# Patient Record
Sex: FEMALE | Race: WHITE | NOT HISPANIC OR LATINO | URBAN - METROPOLITAN AREA
[De-identification: names, ages, dates, MRNs, and addresses within clinical notes are randomized per-mention and may not be internally consistent; named-entity substitution may affect disease eponyms.]

---

## 2018-10-18 ENCOUNTER — OUTPATIENT (OUTPATIENT)
Dept: OUTPATIENT SERVICES | Facility: HOSPITAL | Age: 73
LOS: 1 days | End: 2018-10-18

## 2019-10-24 PROBLEM — Z00.00 ENCOUNTER FOR PREVENTIVE HEALTH EXAMINATION: Status: ACTIVE | Noted: 2019-10-24

## 2019-10-25 ENCOUNTER — APPOINTMENT (OUTPATIENT)
Dept: CARDIOLOGY | Facility: CLINIC | Age: 74
End: 2019-10-25
Payer: MEDICARE

## 2019-10-25 ENCOUNTER — NON-APPOINTMENT (OUTPATIENT)
Age: 74
End: 2019-10-25

## 2019-10-25 VITALS
HEART RATE: 60 BPM | WEIGHT: 114 LBS | SYSTOLIC BLOOD PRESSURE: 110 MMHG | OXYGEN SATURATION: 98 % | HEIGHT: 65 IN | DIASTOLIC BLOOD PRESSURE: 60 MMHG | BODY MASS INDEX: 18.99 KG/M2

## 2019-10-25 DIAGNOSIS — R94.31 ABNORMAL ELECTROCARDIOGRAM [ECG] [EKG]: ICD-10-CM

## 2019-10-25 DIAGNOSIS — Z82.0 FAMILY HISTORY OF EPILEPSY AND OTHER DISEASES OF THE NERVOUS SYSTEM: ICD-10-CM

## 2019-10-25 DIAGNOSIS — R07.89 OTHER CHEST PAIN: ICD-10-CM

## 2019-10-25 DIAGNOSIS — Z82.49 FAMILY HISTORY OF ISCHEMIC HEART DISEASE AND OTHER DISEASES OF THE CIRCULATORY SYSTEM: ICD-10-CM

## 2019-10-25 DIAGNOSIS — Z87.891 PERSONAL HISTORY OF NICOTINE DEPENDENCE: ICD-10-CM

## 2019-10-25 PROCEDURE — 99214 OFFICE O/P EST MOD 30 MIN: CPT

## 2019-10-25 NOTE — PHYSICAL EXAM
[Normal Appearance] : normal appearance [General Appearance - Well Developed] : well developed [Well Groomed] : well groomed [General Appearance - Well Nourished] : well nourished [No Deformities] : no deformities [General Appearance - In No Acute Distress] : no acute distress [Normal Conjunctiva] : the conjunctiva exhibited no abnormalities [Eyelids - No Xanthelasma] : the eyelids demonstrated no xanthelasmas [Normal Oral Mucosa] : normal oral mucosa [Normal Jugular Venous A Waves Present] : normal jugular venous A waves present [No Oral Cyanosis] : no oral cyanosis [No Oral Pallor] : no oral pallor [Normal Jugular Venous V Waves Present] : normal jugular venous V waves present [No Jugular Venous Logan A Waves] : no jugular venous logan A waves [Heart Rate And Rhythm] : heart rate and rhythm were normal [Heart Sounds] : normal S1 and S2 [Murmurs] : no murmurs present [Respiration, Rhythm And Depth] : normal respiratory rhythm and effort [Auscultation Breath Sounds / Voice Sounds] : lungs were clear to auscultation bilaterally [Abdomen Soft] : soft [Exaggerated Use Of Accessory Muscles For Inspiration] : no accessory muscle use [] : no hepato-splenomegaly [Abdomen Tenderness] : non-tender [Abdomen Mass (___ Cm)] : no abdominal mass palpated

## 2019-10-25 NOTE — ASSESSMENT
[FreeTextEntry1] : Abnormal ECG. Occasional atypical chest pain. Exercise stress test will be done to rule out cardiac ischemia. The best option will be stress echocardiogram. Baseline echo will be done as well to evaluate segmental wall motion. Decreased carotid upstrokes. Screening carotid ultrasound will be scheduled. Followup after testing.

## 2019-10-25 NOTE — HISTORY OF PRESENT ILLNESS
[FreeTextEntry1] : Patient is presenting here today for cardiac evaluation. She was seen by primary physician. She was found to have abnormal ECG. Patient is physically very active. Few weeks ago she noted occasional tight sensation in the chest. No shortness of breath. No palpitations.

## 2019-10-28 ENCOUNTER — APPOINTMENT (OUTPATIENT)
Dept: CARDIOLOGY | Facility: CLINIC | Age: 74
End: 2019-10-28
Payer: MEDICARE

## 2019-10-28 PROCEDURE — 93306 TTE W/DOPPLER COMPLETE: CPT

## 2019-10-28 PROCEDURE — 93880 EXTRACRANIAL BILAT STUDY: CPT

## 2019-10-31 ENCOUNTER — APPOINTMENT (OUTPATIENT)
Dept: CARDIOLOGY | Facility: CLINIC | Age: 74
End: 2019-10-31
Payer: MEDICARE

## 2019-10-31 PROCEDURE — 93351 STRESS TTE COMPLETE: CPT

## 2019-11-01 ENCOUNTER — APPOINTMENT (OUTPATIENT)
Dept: CARDIOLOGY | Facility: CLINIC | Age: 74
End: 2019-11-01

## 2023-06-14 ENCOUNTER — APPOINTMENT (OUTPATIENT)
Dept: OPHTHALMOLOGY | Facility: CLINIC | Age: 78
End: 2023-06-14
Payer: MEDICARE

## 2023-06-14 ENCOUNTER — NON-APPOINTMENT (OUTPATIENT)
Age: 78
End: 2023-06-14

## 2023-06-14 PROCEDURE — 92002 INTRM OPH EXAM NEW PATIENT: CPT

## 2023-07-10 ENCOUNTER — APPOINTMENT (OUTPATIENT)
Dept: OPHTHALMOLOGY | Facility: CLINIC | Age: 78
End: 2023-07-10
Payer: MEDICARE

## 2023-07-10 ENCOUNTER — NON-APPOINTMENT (OUTPATIENT)
Age: 78
End: 2023-07-10

## 2023-07-10 PROCEDURE — 99213 OFFICE O/P EST LOW 20 MIN: CPT

## 2023-07-24 ENCOUNTER — NON-APPOINTMENT (OUTPATIENT)
Age: 78
End: 2023-07-24

## 2023-07-24 ENCOUNTER — APPOINTMENT (OUTPATIENT)
Dept: OPHTHALMOLOGY | Facility: CLINIC | Age: 78
End: 2023-07-24
Payer: MEDICARE

## 2023-07-24 PROCEDURE — 99214 OFFICE O/P EST MOD 30 MIN: CPT

## 2025-08-13 ENCOUNTER — OFFICE (OUTPATIENT)
Dept: URBAN - METROPOLITAN AREA CLINIC 97 | Facility: CLINIC | Age: 80
Setting detail: OPHTHALMOLOGY
End: 2025-08-13
Payer: MEDICARE

## 2025-08-13 DIAGNOSIS — H01.005: ICD-10-CM

## 2025-08-13 DIAGNOSIS — H01.002: ICD-10-CM

## 2025-08-13 DIAGNOSIS — H01.001: ICD-10-CM

## 2025-08-13 DIAGNOSIS — H01.004: ICD-10-CM

## 2025-08-13 DIAGNOSIS — H35.372: ICD-10-CM

## 2025-08-13 PROBLEM — B88.0: Status: ACTIVE | Noted: 2025-08-13

## 2025-08-13 PROBLEM — H02.035 ENTROPION, SENILE; RIGHT LOWER LID, LEFT LOWER LID: Status: ACTIVE | Noted: 2025-08-13

## 2025-08-13 PROBLEM — G43.109 MIGRAINE-W/ AURA: Status: ACTIVE | Noted: 2025-08-13

## 2025-08-13 PROBLEM — H02.032 ENTROPION, SENILE; RIGHT LOWER LID, LEFT LOWER LID: Status: ACTIVE | Noted: 2025-08-13

## 2025-08-13 PROBLEM — H43.813 POSTERIOR VITREOUS DETACHMENT; BOTH EYES: Status: ACTIVE | Noted: 2025-08-13

## 2025-08-13 PROCEDURE — 92134 CPTRZ OPH DX IMG PST SGM RTA: CPT | Performed by: OPTOMETRIST

## 2025-08-13 PROCEDURE — 92004 COMPRE OPH EXAM NEW PT 1/>: CPT | Performed by: OPTOMETRIST

## 2025-08-13 ASSESSMENT — REFRACTION_AUTOREFRACTION
OD_SPHERE: -1.50
OD_CYLINDER: +1.25
OD_AXIS: 029
OS_AXIS: 009
OS_CYLINDER: +0.75
OS_SPHERE: -1.00

## 2025-08-13 ASSESSMENT — KERATOMETRY
OS_AXISANGLE_DEGREES: 012
OS_K1POWER_DIOPTERS: 41.50
OS_K2POWER_DIOPTERS: 43.00
OD_K2POWER_DIOPTERS: 42.75
METHOD_AUTO_MANUAL: AUTO
OD_K1POWER_DIOPTERS: 40.75
OD_AXISANGLE_DEGREES: 021

## 2025-08-13 ASSESSMENT — CONFRONTATIONAL VISUAL FIELD TEST (CVF)
OS_FINDINGS: FULL
OD_FINDINGS: FULL

## 2025-08-13 ASSESSMENT — LID EXAM ASSESSMENTS
OS_BLEPHARITIS: LLL LUL 2+
OD_BLEPHARITIS: RLL RUL 2+

## 2025-08-13 ASSESSMENT — LID POSITION - ENTROPION
OS_ENTROPION: LLL 1+
OD_ENTROPION: RLL 1+

## 2025-08-13 ASSESSMENT — REFRACTION_MANIFEST
OD_SPHERE: -1.50
OS_AXIS: 009
OS_VA1: 20/40-
OS_CYLINDER: +0.75
OD_CYLINDER: +1.25
OD_VA1: 20/30-
OD_AXIS: 029
OS_SPHERE: -1.00

## 2025-08-13 ASSESSMENT — VISUAL ACUITY
OD_BCVA: 20/40-
OS_BCVA: 20/30-